# Patient Record
Sex: MALE | Race: WHITE | NOT HISPANIC OR LATINO | Employment: FULL TIME | URBAN - METROPOLITAN AREA
[De-identification: names, ages, dates, MRNs, and addresses within clinical notes are randomized per-mention and may not be internally consistent; named-entity substitution may affect disease eponyms.]

---

## 2018-07-23 VITALS
DIASTOLIC BLOOD PRESSURE: 78 MMHG | BODY MASS INDEX: 26.29 KG/M2 | SYSTOLIC BLOOD PRESSURE: 117 MMHG | HEART RATE: 86 BPM | WEIGHT: 198.4 LBS | HEIGHT: 73 IN

## 2018-07-23 DIAGNOSIS — S46.011A ROTATOR CUFF STRAIN, RIGHT, INITIAL ENCOUNTER: Primary | ICD-10-CM

## 2018-07-23 PROCEDURE — 99213 OFFICE O/P EST LOW 20 MIN: CPT | Performed by: STUDENT IN AN ORGANIZED HEALTH CARE EDUCATION/TRAINING PROGRAM

## 2018-07-23 RX ORDER — METFORMIN HYDROCHLORIDE 500 MG/1
TABLET, EXTENDED RELEASE ORAL
COMMUNITY
Start: 2018-06-20

## 2018-07-23 NOTE — PROGRESS NOTES
Assessment/Plan:    Diagnoses and all orders for this visit:    Rotator cuff strain, right, initial encounter    Other orders  -     metFORMIN (GLUCOPHAGE-XR) 500 mg 24 hr tablet;       Silverio Hawley appears to have experienced a rotator cuff strain that is improving  His physical exam was mostly within normal limits, aside from decreased internal rotation  At this time, we recommended a course of home exercises, ice and use of NSAIDs PRN  If no improvement, he can return back to the office for re-evaluation  He acknowledged understanding and agreement with the plan  Subjective:     HPI    Silverio Hawley is a 43year old male that comes to the office due to concerns of right shoulder pain  He reports that he has been experiencing right shoulder discomfort for the past 1 5 months, but 10 days ago, he went to stretch his shoulder and felt a "grind" in his shoulder and pain associated with difficulty of ROM for approximately two days, associated with a bump on his shoulder  Since then, his symptoms have gradually improved, but has symptoms of "burning" located on the superior and posterior aspects of the right shoulder, intermittent, sharp and burning in quality, mild-moderate intensity, non-radiating  Worse with driving with outstretched arm, overhead movements  Denies numbness, tingling, weakness  Review of Systems   Constitutional: Negative for chills and fever  HENT: Negative for congestion, rhinorrhea and sore throat  Eyes: Negative for visual disturbance  Respiratory: Negative for shortness of breath  Cardiovascular: Negative for chest pain  Gastrointestinal: Negative for abdominal pain  Musculoskeletal: Positive for arthralgias (right shoulder pain)  Skin: Negative for rash  Objective:    Vitals:    07/23/18 1329   BP: 117/78   Pulse: 86       Physical Exam   Constitutional: He is oriented to person, place, and time  He appears well-developed and well-nourished  No distress     HENT: Head: Normocephalic and atraumatic  Right Ear: External ear normal    Left Ear: External ear normal    Nose: Nose normal    Eyes: EOM are normal  No scleral icterus  Neck: Neck supple  Pulmonary/Chest: Effort normal  No respiratory distress  Abdominal: Soft  Neurological: He is alert and oriented to person, place, and time  Skin: Skin is warm  He is not diaphoretic  Psychiatric: He has a normal mood and affect         Right Shoulder Exam     Tenderness   None    Range of Motion   Normal right shoulder ROM  Internal Rotation 0 degrees:      T12  Internal Rotation 90 degrees:    Normal    Muscle Strength   Normal right shoulder strength    Tests   Impingement:   Negative  Whitley:          Negative  Cross Arm:      Negative  Apprehension: Negative    Comments:  Equivocal empty can test

## 2022-06-27 ENCOUNTER — DIABETIC EYE EXAM (OUTPATIENT)
Dept: URBAN - METROPOLITAN AREA CLINIC 6 | Facility: CLINIC | Age: 47
End: 2022-06-27

## 2022-06-27 DIAGNOSIS — E11.9: ICD-10-CM

## 2022-06-27 DIAGNOSIS — H52.13: ICD-10-CM

## 2022-06-27 DIAGNOSIS — H52.4: ICD-10-CM

## 2022-06-27 PROCEDURE — 92014 COMPRE OPH EXAM EST PT 1/>: CPT

## 2022-06-27 PROCEDURE — 92015 DETERMINE REFRACTIVE STATE: CPT

## 2022-06-27 ASSESSMENT — TONOMETRY
OD_IOP_MMHG: 15
OS_IOP_MMHG: 16

## 2022-06-27 ASSESSMENT — VISUAL ACUITY
OD_CC: 20/30-2
OS_CC: 20/30-1